# Patient Record
Sex: FEMALE | Race: OTHER | HISPANIC OR LATINO | URBAN - METROPOLITAN AREA
[De-identification: names, ages, dates, MRNs, and addresses within clinical notes are randomized per-mention and may not be internally consistent; named-entity substitution may affect disease eponyms.]

---

## 2024-11-08 ENCOUNTER — INPATIENT (INPATIENT)
Facility: HOSPITAL | Age: 79
LOS: 1 days | Discharge: HOME | DRG: 197 | End: 2024-11-10
Attending: FAMILY MEDICINE | Admitting: FAMILY MEDICINE
Payer: MEDICAID

## 2024-11-08 VITALS
OXYGEN SATURATION: 98 % | SYSTOLIC BLOOD PRESSURE: 171 MMHG | WEIGHT: 144.18 LBS | RESPIRATION RATE: 18 BRPM | TEMPERATURE: 98 F | DIASTOLIC BLOOD PRESSURE: 99 MMHG | HEART RATE: 91 BPM

## 2024-11-08 DIAGNOSIS — I82.409 ACUTE EMBOLISM AND THROMBOSIS OF UNSPECIFIED DEEP VEINS OF UNSPECIFIED LOWER EXTREMITY: ICD-10-CM

## 2024-11-08 LAB
ALBUMIN SERPL ELPH-MCNC: 4 G/DL — SIGNIFICANT CHANGE UP (ref 3.3–5)
ALP SERPL-CCNC: 114 U/L — SIGNIFICANT CHANGE UP (ref 40–120)
ALT FLD-CCNC: 16 U/L — SIGNIFICANT CHANGE UP (ref 12–78)
ANION GAP SERPL CALC-SCNC: 4 MMOL/L — LOW (ref 5–17)
APTT BLD: 28.3 SEC — SIGNIFICANT CHANGE UP (ref 24.5–35.6)
AST SERPL-CCNC: 16 U/L — SIGNIFICANT CHANGE UP (ref 15–37)
BASOPHILS # BLD AUTO: 0.05 K/UL — SIGNIFICANT CHANGE UP (ref 0–0.2)
BASOPHILS NFR BLD AUTO: 0.7 % — SIGNIFICANT CHANGE UP (ref 0–2)
BILIRUB SERPL-MCNC: 0.2 MG/DL — SIGNIFICANT CHANGE UP (ref 0.2–1.2)
BUN SERPL-MCNC: 28 MG/DL — HIGH (ref 7–23)
CALCIUM SERPL-MCNC: 9.9 MG/DL — SIGNIFICANT CHANGE UP (ref 8.5–10.1)
CHLORIDE SERPL-SCNC: 106 MMOL/L — SIGNIFICANT CHANGE UP (ref 96–108)
CO2 SERPL-SCNC: 28 MMOL/L — SIGNIFICANT CHANGE UP (ref 22–31)
CREAT SERPL-MCNC: 1.14 MG/DL — SIGNIFICANT CHANGE UP (ref 0.5–1.3)
EGFR: 49 ML/MIN/1.73M2 — LOW
EOSINOPHIL # BLD AUTO: 0.22 K/UL — SIGNIFICANT CHANGE UP (ref 0–0.5)
EOSINOPHIL NFR BLD AUTO: 2.9 % — SIGNIFICANT CHANGE UP (ref 0–6)
GLUCOSE SERPL-MCNC: 159 MG/DL — HIGH (ref 70–99)
HCT VFR BLD CALC: 36.1 % — SIGNIFICANT CHANGE UP (ref 34.5–45)
HGB BLD-MCNC: 11.6 G/DL — SIGNIFICANT CHANGE UP (ref 11.5–15.5)
IMM GRANULOCYTES NFR BLD AUTO: 0.3 % — SIGNIFICANT CHANGE UP (ref 0–0.9)
INR BLD: 0.91 RATIO — SIGNIFICANT CHANGE UP (ref 0.85–1.16)
LYMPHOCYTES # BLD AUTO: 1.13 K/UL — SIGNIFICANT CHANGE UP (ref 1–3.3)
LYMPHOCYTES # BLD AUTO: 14.7 % — SIGNIFICANT CHANGE UP (ref 13–44)
MCHC RBC-ENTMCNC: 29 PG — SIGNIFICANT CHANGE UP (ref 27–34)
MCHC RBC-ENTMCNC: 32.1 G/DL — SIGNIFICANT CHANGE UP (ref 32–36)
MCV RBC AUTO: 90.3 FL — SIGNIFICANT CHANGE UP (ref 80–100)
MONOCYTES # BLD AUTO: 0.56 K/UL — SIGNIFICANT CHANGE UP (ref 0–0.9)
MONOCYTES NFR BLD AUTO: 7.3 % — SIGNIFICANT CHANGE UP (ref 2–14)
NEUTROPHILS # BLD AUTO: 5.69 K/UL — SIGNIFICANT CHANGE UP (ref 1.8–7.4)
NEUTROPHILS NFR BLD AUTO: 74.1 % — SIGNIFICANT CHANGE UP (ref 43–77)
PLATELET # BLD AUTO: 307 K/UL — SIGNIFICANT CHANGE UP (ref 150–400)
POTASSIUM SERPL-MCNC: 4.3 MMOL/L — SIGNIFICANT CHANGE UP (ref 3.5–5.3)
POTASSIUM SERPL-SCNC: 4.3 MMOL/L — SIGNIFICANT CHANGE UP (ref 3.5–5.3)
PROT SERPL-MCNC: 8 GM/DL — SIGNIFICANT CHANGE UP (ref 6–8.3)
PROTHROM AB SERPL-ACNC: 10.7 SEC — SIGNIFICANT CHANGE UP (ref 9.9–13.4)
RBC # BLD: 4 M/UL — SIGNIFICANT CHANGE UP (ref 3.8–5.2)
RBC # FLD: 13.9 % — SIGNIFICANT CHANGE UP (ref 10.3–14.5)
SODIUM SERPL-SCNC: 138 MMOL/L — SIGNIFICANT CHANGE UP (ref 135–145)
WBC # BLD: 7.67 K/UL — SIGNIFICANT CHANGE UP (ref 3.8–10.5)
WBC # FLD AUTO: 7.67 K/UL — SIGNIFICANT CHANGE UP (ref 3.8–10.5)

## 2024-11-08 PROCEDURE — 84100 ASSAY OF PHOSPHORUS: CPT

## 2024-11-08 PROCEDURE — 73562 X-RAY EXAM OF KNEE 3: CPT | Mod: 26,LT

## 2024-11-08 PROCEDURE — 71045 X-RAY EXAM CHEST 1 VIEW: CPT | Mod: 26

## 2024-11-08 PROCEDURE — 99285 EMERGENCY DEPT VISIT HI MDM: CPT

## 2024-11-08 PROCEDURE — 93005 ELECTROCARDIOGRAM TRACING: CPT

## 2024-11-08 PROCEDURE — 80061 LIPID PANEL: CPT

## 2024-11-08 PROCEDURE — 93010 ELECTROCARDIOGRAM REPORT: CPT

## 2024-11-08 PROCEDURE — 80053 COMPREHEN METABOLIC PANEL: CPT

## 2024-11-08 PROCEDURE — 76376 3D RENDER W/INTRP POSTPROCES: CPT

## 2024-11-08 PROCEDURE — 85027 COMPLETE CBC AUTOMATED: CPT

## 2024-11-08 PROCEDURE — 83735 ASSAY OF MAGNESIUM: CPT

## 2024-11-08 PROCEDURE — 36415 COLL VENOUS BLD VENIPUNCTURE: CPT

## 2024-11-08 PROCEDURE — 93306 TTE W/DOPPLER COMPLETE: CPT

## 2024-11-08 PROCEDURE — 93970 EXTREMITY STUDY: CPT | Mod: 26

## 2024-11-08 PROCEDURE — 83036 HEMOGLOBIN GLYCOSYLATED A1C: CPT

## 2024-11-08 RX ORDER — APIXABAN 5 MG/1
1 TABLET, FILM COATED ORAL
Qty: 74 | Refills: 0
Start: 2024-11-08 | End: 2024-12-07

## 2024-11-08 RX ORDER — APIXABAN 5 MG/1
10 TABLET, FILM COATED ORAL ONCE
Refills: 0 | Status: COMPLETED | OUTPATIENT
Start: 2024-11-08 | End: 2024-11-08

## 2024-11-08 RX ORDER — ACETAMINOPHEN 500 MG
1000 TABLET ORAL ONCE
Refills: 0 | Status: COMPLETED | OUTPATIENT
Start: 2024-11-08 | End: 2024-11-08

## 2024-11-08 RX ORDER — ACETAMINOPHEN 500 MG
650 TABLET ORAL ONCE
Refills: 0 | Status: DISCONTINUED | OUTPATIENT
Start: 2024-11-08 | End: 2024-11-10

## 2024-11-08 RX ADMIN — APIXABAN 10 MILLIGRAM(S): 5 TABLET, FILM COATED ORAL at 20:43

## 2024-11-08 RX ADMIN — Medication 400 MILLIGRAM(S): at 20:42

## 2024-11-08 NOTE — ED ADULT NURSE NOTE - OBJECTIVE STATEMENT
Pt presents to ED w/ bilateral lower leg swelling and pain since last Monday. Pt notes she recently traveled from [facility] x2 weeks ago. Endorses difficulty walking. -SOB.

## 2024-11-08 NOTE — PHARMACOTHERAPY INTERVENTION NOTE - COMMENTS
Medication history complete, reviewed medications with patient grandchild at bedside no medication history records found on doctor first med hx.

## 2024-11-08 NOTE — ED ADULT NURSE NOTE - CHIEF COMPLAINT QUOTE
PT presents to er with complaints of LLE pain since last Monday, states she just traveled from [facility] 2 weeks ago, denies medical history, SOB, Chest pain.

## 2024-11-08 NOTE — ED STATDOCS - OBJECTIVE STATEMENT
78 y/o female w/PMHx of HTN. Pt is presenting to the ED c/o intermittent LE swelling and LE pain since last Monday. Pt mentions that she went to [facility] x2 weeks ago. Pt also endorses knee cramps.  Pt denies and difficulty breathing, chest pain, nausea, vomiting, and numbness/tingling. 80 y/o female w/PMHx of HTN. Pt is presenting to the ED c/o intermittent LE swelling and LE pain since last Monday. Pt mentions that she came from [facility] x2 weeks ago. Pt also endorses knee cramps.  Pt denies difficulty breathing, chest pain, nausea, vomiting, and numbness/tingling. no trauma.

## 2024-11-08 NOTE — ED STATDOCS - PHYSICAL EXAMINATION
General: Patient in no acute distress, AAOX3.   HENMT: NC/AT, no nasal congestion, MMM  Neck: supple  CVS: regular rate and rhythm, no murmur  Resp: Good air entry bilaterally, No wheeze/rhonchi.  Abd: Soft non tender, non distended, +bowel sounds, No guarding, rebound tenderness   Ext: FROM in all ext, 2+ pulses throughout, cap refill<2 sec. LLE w/ + tenderness to palpation and mild edema in calf , back of the knee area.   BACK: no midline tenderness, no stepoffs, no CVA ttp  NEURO: no focal deficit, gross motor and sensory intact throughout, General: Patient in no acute distress, AAOX3.   HENMT: NC/AT, no nasal congestion, MMM  Neck: supple  CVS: regular rate and rhythm, no murmur  Resp: Good air entry bilaterally, No wheeze/rhonchi.  Abd: Soft non tender, non distended, +bowel sounds, No guarding, rebound tenderness   Ext: FROM in all ext, 2+ pulses throughout, cap refill<2 sec. LLE w/ + ttp and mild edema in calf , back of the knee area.   BACK: no midline tenderness, no stepoffs, no CVA ttp  NEURO: no focal deficit, gross motor and sensory intact throughout,

## 2024-11-08 NOTE — ED STATDOCS - CARE PLAN
1 Principal Discharge DX:	DVT, lower extremity   Principal Discharge DX:	DVT, lower extremity  Secondary Diagnosis:	Leg pain, right  Secondary Diagnosis:	Unable to walk

## 2024-11-08 NOTE — ED STATDOCS - PROGRESS NOTE DETAILS
signed Mita Aiken PA-C Pt seen and evaluated initially in intake by Dr. Andres.  ID [id number]  79F c/o left lower leg pain since 11/4. pt recently arrived from [facility]. No CP or SOB. PMH HTN. no hx of DVTor blood clot. Pt says the pain got a lot worse yesterday and its painful to walk. No PMD. Labs with slightly elevated BUN. I discussed risks/benefits of anticoagulation with pt including risks of bleeding either spontaneously or from minor injury and need to seek medical attention for these situations. Pt agrees with anticoagulation and will f/u with a doctor this week.  Pt here with family who also heard f/u instructions and precautions. signed Mita Aiken PA-C   Pt still having a lot of pain and unable to weight bear or walk. Will admit, unable to safely discharge pt home, she is a fall risk and narcotic medications would only increase risk of fall. Pt agrees with admission and plan of care. Hospitalist paged. signed Mita Aiken PA-C   Case discussed with and admission accepted by hospitalist Dr. Tariq.

## 2024-11-08 NOTE — ED STATDOCS - CLINICAL SUMMARY MEDICAL DECISION MAKING FREE TEXT BOX
78 y/o female w/PMHx of HTN. Pt is presenting to the ED c/o intermittent LE swelling and LE pain since last Monday. will rule out DVT vs. arthritis. Plan to do Labs, imaging and reassess.

## 2024-11-08 NOTE — ED ADULT TRIAGE NOTE - CHIEF COMPLAINT QUOTE
PT presents to er with complaints of LLE pain since last Monday, states she just traveled from [facility] 2 weeks ago, denies medical history, SOB, Chest pain.

## 2024-11-08 NOTE — ED ADULT NURSE NOTE - NSFALLRISKINTERV_ED_ALL_ED
Assistance OOB with selected safe patient handling equipment if applicable/Assistance with ambulation/Communicate fall risk and risk factors to all staff, patient, and family/Monitor gait and stability/Provide visual cue: yellow wristband, yellow gown, etc/Reinforce activity limits and safety measures with patient and family/Call bell, personal items and telephone in reach/Instruct patient to call for assistance before getting out of bed/chair/stretcher/Non-slip footwear applied when patient is off stretcher/[place] to call system/Physically safe environment - no spills, clutter or unnecessary equipment/Purposeful Proactive Rounding/Room/bathroom lighting operational, light cord in reach

## 2024-11-08 NOTE — ED STATDOCS - ATTENDING APP SHARED VISIT CONTRIBUTION OF CARE
I Trudi Andres DO have personally seen and examined this patient.  I have fully participated in the care of this patient.  The initial assessment was performed by myself and then the patient was handed off to the ACP. The patient was followed and re-evaluated by the ACP. All labs, imaging and procedures were evaluated and performed by the ACP and I was available for consultation if any questions in the patients care came up.I have made amendments to the documentation where appropriate and otherwise agree with the history, physical exam, and plan as documented by the GABE.

## 2024-11-09 LAB
A1C WITH ESTIMATED AVERAGE GLUCOSE RESULT: 6 % — HIGH (ref 4–5.6)
ALBUMIN SERPL ELPH-MCNC: 3.7 G/DL — SIGNIFICANT CHANGE UP (ref 3.3–5)
ALP SERPL-CCNC: 106 U/L — SIGNIFICANT CHANGE UP (ref 40–120)
ALT FLD-CCNC: 15 U/L — SIGNIFICANT CHANGE UP (ref 12–78)
ANION GAP SERPL CALC-SCNC: 8 MMOL/L — SIGNIFICANT CHANGE UP (ref 5–17)
AST SERPL-CCNC: 14 U/L — LOW (ref 15–37)
BILIRUB SERPL-MCNC: 0.3 MG/DL — SIGNIFICANT CHANGE UP (ref 0.2–1.2)
BUN SERPL-MCNC: 25 MG/DL — HIGH (ref 7–23)
CALCIUM SERPL-MCNC: 9.5 MG/DL — SIGNIFICANT CHANGE UP (ref 8.5–10.1)
CHLORIDE SERPL-SCNC: 106 MMOL/L — SIGNIFICANT CHANGE UP (ref 96–108)
CHOLEST SERPL-MCNC: 221 MG/DL — HIGH
CO2 SERPL-SCNC: 25 MMOL/L — SIGNIFICANT CHANGE UP (ref 22–31)
CREAT SERPL-MCNC: 1.02 MG/DL — SIGNIFICANT CHANGE UP (ref 0.5–1.3)
EGFR: 56 ML/MIN/1.73M2 — LOW
ESTIMATED AVERAGE GLUCOSE: 126 MG/DL — HIGH (ref 68–114)
GLUCOSE SERPL-MCNC: 155 MG/DL — HIGH (ref 70–99)
HCT VFR BLD CALC: 34.2 % — LOW (ref 34.5–45)
HDLC SERPL-MCNC: 67 MG/DL — SIGNIFICANT CHANGE UP
HGB BLD-MCNC: 11.1 G/DL — LOW (ref 11.5–15.5)
LIPID PNL WITH DIRECT LDL SERPL: 138 MG/DL — HIGH
MAGNESIUM SERPL-MCNC: 1.9 MG/DL — SIGNIFICANT CHANGE UP (ref 1.6–2.6)
MCHC RBC-ENTMCNC: 28.8 PG — SIGNIFICANT CHANGE UP (ref 27–34)
MCHC RBC-ENTMCNC: 32.5 G/DL — SIGNIFICANT CHANGE UP (ref 32–36)
MCV RBC AUTO: 88.6 FL — SIGNIFICANT CHANGE UP (ref 80–100)
NON HDL CHOLESTEROL: 154 MG/DL — HIGH
PHOSPHATE SERPL-MCNC: 4.3 MG/DL — SIGNIFICANT CHANGE UP (ref 2.5–4.5)
PLATELET # BLD AUTO: 302 K/UL — SIGNIFICANT CHANGE UP (ref 150–400)
POTASSIUM SERPL-MCNC: 3.8 MMOL/L — SIGNIFICANT CHANGE UP (ref 3.5–5.3)
POTASSIUM SERPL-SCNC: 3.8 MMOL/L — SIGNIFICANT CHANGE UP (ref 3.5–5.3)
PROT SERPL-MCNC: 7.4 GM/DL — SIGNIFICANT CHANGE UP (ref 6–8.3)
RBC # BLD: 3.86 M/UL — SIGNIFICANT CHANGE UP (ref 3.8–5.2)
RBC # FLD: 14.1 % — SIGNIFICANT CHANGE UP (ref 10.3–14.5)
SODIUM SERPL-SCNC: 139 MMOL/L — SIGNIFICANT CHANGE UP (ref 135–145)
TRIGL SERPL-MCNC: 92 MG/DL — SIGNIFICANT CHANGE UP
WBC # BLD: 8.53 K/UL — SIGNIFICANT CHANGE UP (ref 3.8–10.5)
WBC # FLD AUTO: 8.53 K/UL — SIGNIFICANT CHANGE UP (ref 3.8–10.5)

## 2024-11-09 PROCEDURE — 93306 TTE W/DOPPLER COMPLETE: CPT | Mod: 26

## 2024-11-09 PROCEDURE — 76376 3D RENDER W/INTRP POSTPROCES: CPT | Mod: 26

## 2024-11-09 PROCEDURE — 99222 1ST HOSP IP/OBS MODERATE 55: CPT

## 2024-11-09 RX ORDER — HYDRALAZINE HYDROCHLORIDE 50 MG/1
5 TABLET, FILM COATED ORAL ONCE
Refills: 0 | Status: COMPLETED | OUTPATIENT
Start: 2024-11-09 | End: 2024-11-09

## 2024-11-09 RX ORDER — APIXABAN 5 MG/1
10 TABLET, FILM COATED ORAL EVERY 12 HOURS
Refills: 0 | Status: DISCONTINUED | OUTPATIENT
Start: 2024-11-09 | End: 2024-11-10

## 2024-11-09 RX ORDER — OXYCODONE HYDROCHLORIDE 30 MG/1
5 TABLET ORAL EVERY 4 HOURS
Refills: 0 | Status: DISCONTINUED | OUTPATIENT
Start: 2024-11-09 | End: 2024-11-10

## 2024-11-09 RX ORDER — SENNA 187 MG
2 TABLET ORAL AT BEDTIME
Refills: 0 | Status: DISCONTINUED | OUTPATIENT
Start: 2024-11-09 | End: 2024-11-10

## 2024-11-09 RX ORDER — ACETAMINOPHEN 500 MG
1000 TABLET ORAL EVERY 8 HOURS
Refills: 0 | Status: DISCONTINUED | OUTPATIENT
Start: 2024-11-09 | End: 2024-11-10

## 2024-11-09 RX ORDER — PANTOPRAZOLE SODIUM 40 MG/1
40 TABLET, DELAYED RELEASE ORAL
Refills: 0 | Status: DISCONTINUED | OUTPATIENT
Start: 2024-11-09 | End: 2024-11-10

## 2024-11-09 RX ORDER — NALOXONE HYDROCHLORIDE 1 MG/ML
0.4 INJECTION, SOLUTION INTRAMUSCULAR; INTRAVENOUS; SUBCUTANEOUS ONCE
Refills: 0 | Status: DISCONTINUED | OUTPATIENT
Start: 2024-11-09 | End: 2024-11-10

## 2024-11-09 RX ORDER — LOSARTAN POTASSIUM 25 MG/1
50 TABLET ORAL DAILY
Refills: 0 | Status: DISCONTINUED | OUTPATIENT
Start: 2024-11-09 | End: 2024-11-10

## 2024-11-09 RX ORDER — OXYCODONE HYDROCHLORIDE 30 MG/1
2.5 TABLET ORAL EVERY 4 HOURS
Refills: 0 | Status: DISCONTINUED | OUTPATIENT
Start: 2024-11-09 | End: 2024-11-10

## 2024-11-09 RX ORDER — POLYETHYLENE GLYCOL 3350 17 G/17G
17 POWDER, FOR SOLUTION ORAL DAILY
Refills: 0 | Status: DISCONTINUED | OUTPATIENT
Start: 2024-11-09 | End: 2024-11-10

## 2024-11-09 RX ADMIN — PANTOPRAZOLE SODIUM 40 MILLIGRAM(S): 40 TABLET, DELAYED RELEASE ORAL at 05:38

## 2024-11-09 RX ADMIN — APIXABAN 10 MILLIGRAM(S): 5 TABLET, FILM COATED ORAL at 09:16

## 2024-11-09 RX ADMIN — Medication 1000 MILLIGRAM(S): at 01:51

## 2024-11-09 RX ADMIN — Medication 2 TABLET(S): at 22:58

## 2024-11-09 RX ADMIN — POLYETHYLENE GLYCOL 3350 17 GRAM(S): 17 POWDER, FOR SOLUTION ORAL at 09:16

## 2024-11-09 RX ADMIN — Medication 10 MILLIGRAM(S): at 22:58

## 2024-11-09 RX ADMIN — LOSARTAN POTASSIUM 50 MILLIGRAM(S): 25 TABLET ORAL at 09:16

## 2024-11-09 RX ADMIN — APIXABAN 10 MILLIGRAM(S): 5 TABLET, FILM COATED ORAL at 22:59

## 2024-11-09 RX ADMIN — Medication 1000 MILLIGRAM(S): at 23:50

## 2024-11-09 RX ADMIN — Medication 1000 MILLIGRAM(S): at 23:00

## 2024-11-09 RX ADMIN — Medication 1000 MILLIGRAM(S): at 05:37

## 2024-11-09 RX ADMIN — HYDRALAZINE HYDROCHLORIDE 5 MILLIGRAM(S): 50 TABLET, FILM COATED ORAL at 04:23

## 2024-11-09 NOTE — H&P ADULT - HISTORY OF PRESENT ILLNESS
Patient is a 79-year-old female with a past medical history of hypertension presenting to  ED on 11/8/2024 for pain and swelling behind her left knee.  Patient recently came from [facility] on a 4-1/2-hour flight on 11/1/2024.On 11/3/2024 she started having pain behind her left knee region.  Her daughter had been managing the pain with Tylenol.  However, since the day prior to admissionthe pain had become unbearable and it was difficult for her to walk    Upon arrival to the ED vitals were, /99 HR 91 RR 18 T8.2 F and SpO2 of 98% on room air.  Chest x-ray with no acute pulmonary disease process.  Venous Doppler showed acute DVT of left posterior tibial vein.  She received IV Tylenol 1 g x 1, 10 mg Eliquis x 1 and IV hydralazine 5 mg x 1.    Currently, she denies any fever, chills, palpitations, shortness of breath, chest pain, headache, dizziness.  Family Hx: Denies any blood clotting disorders   Surgical Hx: hernia repair and hysterectomy   Social hx: denies tobacco, alcohol or recreational drug use

## 2024-11-09 NOTE — ED ADULT NURSE REASSESSMENT NOTE - NS ED NURSE REASSESS COMMENT FT1
Pt received from [facility]MS+Ox4, NAD, VSS.  used for  #[id number]. Respirations are equal and unlabored. Pt denies cp, sob, N/V. Daughter at bedside. Pt and family updated on plan of care. Patient profile completed. Awaiting bed assignment and further orders. Care continues as ordered. Fall and safety precautions maintained. Pt within view of nurses station.

## 2024-11-09 NOTE — H&P ADULT - ASSESSMENT
#LLE pain and swelling   #Acute DVT   #Inability to bear weight   - US venous doppler:acute DVT of left posterior tibial vein  - Continue Eliquis 10 mg BID x 7 days   - Continue pain control   - F/u ECHO   - F/u PT consult     #Hypertension  – At home is on irbesartan 150 mg   - Will continue losartan 50 mg QD

## 2024-11-09 NOTE — H&P ADULT - NSHPPHYSICALEXAM_GEN_ALL_CORE
Vital Signs Last 24 Hrs  T(C): 36.7 (09 Nov 2024 04:08), Max: 36.8 (08 Nov 2024 17:09)  T(F): 98 (09 Nov 2024 04:08), Max: 98.2 (08 Nov 2024 17:09)  HR: 72 (09 Nov 2024 04:08) (72 - 91)  BP: 160/92 (09 Nov 2024 04:08) (160/92 - 171/99)  BP(mean): 120 (08 Nov 2024 17:09) (120 - 120)  RR: 19 (09 Nov 2024 04:08) (18 - 19)  SpO2: 98% (09 Nov 2024 04:08) (98% - 98%)    Parameters below as of 09 Nov 2024 04:08  Patient On (Oxygen Delivery Method): room air    GENERAL: NAD  HEAD:  Atraumatic  EYES: EOMI  ENT: Moist mucous membranes  NECK: Supple  CHEST/LUNG: Unlabored respirations  HEART: Regular rate and rhythm  ABDOMEN: Soft, Nontender  EXTREMITIES: tenderness to palpation and mild swelling in the region of the L popliteal fossa  NERVOUS SYSTEM:  Alert & Oriented X3, speech clear. No deficits

## 2024-11-09 NOTE — ED ADULT NURSE REASSESSMENT NOTE - NS ED NURSE REASSESS COMMENT FT1
Patient brought to bathroom with wheelchair and daughter assistance. Pt voided clear yellow urine. Hydralazine administered as per MAR for high BP. Care continues as ordered. Pt awaiting bed assignment and further orders. Pt and family updated on plan of care with MD Tillman at bedside utilizing  phone. VSS. NAD.

## 2024-11-09 NOTE — PROGRESS NOTE ADULT - ASSESSMENT
79-year-old female with a past medical history of hypertension presenting to  ED on 11/8/2024 for pain and swelling behind her left knee.    #LLE pain and swelling   #Acute DVT   #Inability to bear weight   - US venous doppler:acute DVT of left posterior tibial vein  - Continue Eliquis 10 mg BID x 7 days, then 5 mg po BID  - Continue pain control   - F/u ECHO   - F/u PT consult     #Hypertension  – At home is on irbesartan 150 mg   - Will continue losartan 50 mg QD               79-year-old female with a past medical history of hypertension presenting to  ED on 11/8/2024 for pain and swelling behind her left knee.    #LLE pain and swelling   #Acute DVT   #Inability to bear weight   - US venous doppler:acute DVT of left posterior tibial vein  - Continue Eliquis 10 mg BID x 7 days, then 5 mg po BID  - Continue pain control   - F/u ECHO - EF 64%, normal Right ventricular systolic fx  - F/u PT consult     #Hypertension  – At home is on irbesartan 150 mg   - Will continue losartan 50 mg QD

## 2024-11-09 NOTE — PROGRESS NOTE ADULT - SUBJECTIVE AND OBJECTIVE BOX
HOSPITALIST ATTENDING PROGRESS NOTE    Chart and meds reviewed.  Patient seen and examined.    CC/ HPI Patient is a 79y old  Female who presents with a chief complaint of #LLE pain and swelling   #Acute DVT   #Inability to bear weight (09 Nov 2024 08:34)      Subjective: Seen with grandson at bedside. Patient requested grandson to translate. States she developed pain behind her left knee after flight from [facility].  States did not stand up for entire duration of the flight. No trauma to left leg prior to development of symptoms. No shortness of breath.     All other systems reviewed and found to be negative with the exception of what has been described above.    MEDICATIONS:  MEDICATIONS  (STANDING):  acetaminophen     Tablet .. 1000 milliGRAM(s) Oral every 8 hours  apixaban 10 milliGRAM(s) Oral every 12 hours  losartan 50 milliGRAM(s) Oral daily  naloxone Injectable 0.4 milliGRAM(s) IV Push once  pantoprazole    Tablet 40 milliGRAM(s) Oral before breakfast  polyethylene glycol 3350 17 Gram(s) Oral daily  senna 2 Tablet(s) Oral at bedtime      Vital Signs Last 24 Hrs  T(C): 36.8 (09 Nov 2024 15:59), Max: 36.9 (09 Nov 2024 08:08)  T(F): 98.2 (09 Nov 2024 15:59), Max: 98.5 (09 Nov 2024 08:08)  HR: 88 (09 Nov 2024 15:59) (72 - 89)  BP: 151/76 (09 Nov 2024 15:59) (123/68 - 160/92)  BP(mean): 87 (09 Nov 2024 05:17) (87 - 87)  RR: 19 (09 Nov 2024 15:59) (18 - 19)  SpO2: 97% (09 Nov 2024 15:59) (97% - 98%)    Parameters below as of 09 Nov 2024 15:59  Patient On (Oxygen Delivery Method): room air        I&O's Summary    09 Nov 2024 07:01  -  09 Nov 2024 17:18  --------------------------------------------------------  IN: 350 mL / OUT: 350 mL / NET: 0 mL        CAPILLARY BLOOD GLUCOSE          PHYSICAL EXAM:    Constitutional: NAD, awake and alert, well-developed  HEENT:  EOMI, Normal Hearing, MMM  Neck: Soft and supple, No LAD, No JVD  Respiratory: Breath sounds are clear bilaterally, No wheezing, rales or rhonchi  Cardiovascular: S1 and S2, regular rate and rhythm, no Murmurs, gallops or rubs  Gastrointestinal: Bowel Sounds present, soft, nontender, nondistended, no guarding, no rebound  Extremities: No peripheral edema  Vascular: 2+ peripheral pulses  Neurological: A/O x 3, no focal deficits  Musculoskeletal: 5/5 strength b/l upper and lower extremities  Skin: No rashes        LABS: All Labs Reviewed:                        11.1   8.53  )-----------( 302      ( 09 Nov 2024 07:59 )             34.2     11-09    139  |  106  |  25[H]  ----------------------------<  155[H]  3.8   |  25  |  1.02    Ca    9.5      09 Nov 2024 07:59  Phos  4.3     11-09  Mg     1.9     11-09    TPro  7.4  /  Alb  3.7  /  TBili  0.3  /  DBili  x   /  AST  14[L]  /  ALT  15  /  AlkPhos  106  11-09    PT/INR - ( 08 Nov 2024 18:29 )   PT: 10.7 sec;   INR: 0.91 ratio         PTT - ( 08 Nov 2024 18:29 )  PTT:28.3 sec    < from: TTE W or WO Ultrasound Enhancing Agent (11.09.24 @ 11:53) >  CONCLUSIONS:      1. Left ventricular systolic function is normal with ejection fraction 64 %.   2. Normal right ventricular systolic function.   3. Mild to moderate aortic regurgitation.    < end of copied text >      Blood Culture:     RADIOLOGY/EKG:    DVT PPX:    ADVANCED DIRECTIVE:    DISPOSITION: DC planning in 24-48 hours    Total time spent: 50 minutes

## 2024-11-10 VITALS
RESPIRATION RATE: 18 BRPM | OXYGEN SATURATION: 97 % | DIASTOLIC BLOOD PRESSURE: 67 MMHG | SYSTOLIC BLOOD PRESSURE: 119 MMHG | HEART RATE: 65 BPM | TEMPERATURE: 98 F

## 2024-11-10 LAB
ALBUMIN SERPL ELPH-MCNC: 3.5 G/DL — SIGNIFICANT CHANGE UP (ref 3.3–5)
ALP SERPL-CCNC: 88 U/L — SIGNIFICANT CHANGE UP (ref 40–120)
ALT FLD-CCNC: 13 U/L — SIGNIFICANT CHANGE UP (ref 12–78)
ANION GAP SERPL CALC-SCNC: 5 MMOL/L — SIGNIFICANT CHANGE UP (ref 5–17)
AST SERPL-CCNC: 13 U/L — LOW (ref 15–37)
BILIRUB SERPL-MCNC: 0.4 MG/DL — SIGNIFICANT CHANGE UP (ref 0.2–1.2)
BUN SERPL-MCNC: 32 MG/DL — HIGH (ref 7–23)
CALCIUM SERPL-MCNC: 9.9 MG/DL — SIGNIFICANT CHANGE UP (ref 8.5–10.1)
CHLORIDE SERPL-SCNC: 109 MMOL/L — HIGH (ref 96–108)
CO2 SERPL-SCNC: 26 MMOL/L — SIGNIFICANT CHANGE UP (ref 22–31)
CREAT SERPL-MCNC: 1.25 MG/DL — SIGNIFICANT CHANGE UP (ref 0.5–1.3)
EGFR: 44 ML/MIN/1.73M2 — LOW
GLUCOSE SERPL-MCNC: 123 MG/DL — HIGH (ref 70–99)
HCT VFR BLD CALC: 35.3 % — SIGNIFICANT CHANGE UP (ref 34.5–45)
HGB BLD-MCNC: 11.1 G/DL — LOW (ref 11.5–15.5)
MCHC RBC-ENTMCNC: 28.5 PG — SIGNIFICANT CHANGE UP (ref 27–34)
MCHC RBC-ENTMCNC: 31.4 G/DL — LOW (ref 32–36)
MCV RBC AUTO: 90.7 FL — SIGNIFICANT CHANGE UP (ref 80–100)
PLATELET # BLD AUTO: 300 K/UL — SIGNIFICANT CHANGE UP (ref 150–400)
POTASSIUM SERPL-MCNC: 4.1 MMOL/L — SIGNIFICANT CHANGE UP (ref 3.5–5.3)
POTASSIUM SERPL-SCNC: 4.1 MMOL/L — SIGNIFICANT CHANGE UP (ref 3.5–5.3)
PROT SERPL-MCNC: 6.9 GM/DL — SIGNIFICANT CHANGE UP (ref 6–8.3)
RBC # BLD: 3.89 M/UL — SIGNIFICANT CHANGE UP (ref 3.8–5.2)
RBC # FLD: 14.3 % — SIGNIFICANT CHANGE UP (ref 10.3–14.5)
SODIUM SERPL-SCNC: 140 MMOL/L — SIGNIFICANT CHANGE UP (ref 135–145)
WBC # BLD: 5.29 K/UL — SIGNIFICANT CHANGE UP (ref 3.8–10.5)
WBC # FLD AUTO: 5.29 K/UL — SIGNIFICANT CHANGE UP (ref 3.8–10.5)

## 2024-11-10 PROCEDURE — 99239 HOSP IP/OBS DSCHRG MGMT >30: CPT

## 2024-11-10 RX ORDER — OMEPRAZOLE 10 MG
1 CAPSULE,DELAYED RELEASE (ENTERIC COATED) ORAL
Refills: 0 | DISCHARGE

## 2024-11-10 RX ORDER — METHOCARBAMOL 500 MG/1
2 TABLET ORAL
Refills: 0 | DISCHARGE

## 2024-11-10 RX ORDER — IRBESARTAN 300 MG/300MG
1 TABLET ORAL
Qty: 15 | Refills: 0
Start: 2024-11-10 | End: 2024-11-24

## 2024-11-10 RX ORDER — IRBESARTAN 300 MG/300MG
1 TABLET ORAL
Refills: 0 | DISCHARGE

## 2024-11-10 RX ORDER — OMEPRAZOLE 10 MG
1 CAPSULE,DELAYED RELEASE (ENTERIC COATED) ORAL
Qty: 15 | Refills: 0
Start: 2024-11-10 | End: 2024-11-24

## 2024-11-10 RX ORDER — MELATONIN 5 MG
5 TABLET ORAL ONCE
Refills: 0 | Status: COMPLETED | OUTPATIENT
Start: 2024-11-10 | End: 2024-11-10

## 2024-11-10 RX ORDER — MELATONIN 5 MG
5 TABLET ORAL AT BEDTIME
Refills: 0 | Status: DISCONTINUED | OUTPATIENT
Start: 2024-11-10 | End: 2024-11-10

## 2024-11-10 RX ORDER — APIXABAN 5 MG/1
1 TABLET, FILM COATED ORAL
Qty: 74 | Refills: 0
Start: 2024-11-10 | End: 2024-12-09

## 2024-11-10 RX ADMIN — LOSARTAN POTASSIUM 50 MILLIGRAM(S): 25 TABLET ORAL at 09:32

## 2024-11-10 RX ADMIN — APIXABAN 10 MILLIGRAM(S): 5 TABLET, FILM COATED ORAL at 09:32

## 2024-11-10 RX ADMIN — Medication 1000 MILLIGRAM(S): at 06:08

## 2024-11-10 RX ADMIN — POLYETHYLENE GLYCOL 3350 17 GRAM(S): 17 POWDER, FOR SOLUTION ORAL at 09:32

## 2024-11-10 RX ADMIN — Medication 5 MILLIGRAM(S): at 00:44

## 2024-11-10 RX ADMIN — PANTOPRAZOLE SODIUM 40 MILLIGRAM(S): 40 TABLET, DELAYED RELEASE ORAL at 06:09

## 2024-11-10 NOTE — DISCHARGE NOTE PROVIDER - NSDCFUADDAPPT_GEN_ALL_CORE_FT
APPTS ARE READY TO BE MADE: [X] YES    Best Family or Patient Contact (if needed):    Additional Information about above appointments (if needed):    1: Thelma Ellison  2:  3:  APPTS ARE READY TO BE MADE: [X] YES    Best Family or Patient Contact (if needed):    Additional Information about above appointments (if needed):    1: Thelma Ellison  [place] [place]  2:  3:         11/15-Provided patient with provider/clinic referral information and advised them to follow-up with their insurance company. Spoke with  the Clinic in [place] and was told that patient need to add Dr. Thelma Ellison in the insurance to be able to go to the clinic. I advised patient to add  into the ins and call back IPR

## 2024-11-10 NOTE — DISCHARGE NOTE PROVIDER - HOSPITAL COURSE
Patient is a 79 y.o. female who presented with LLE pain and swelling with PMH HTN admitted for Left LE DVT and treated with Eliquis 10 mg oral twice daily.  Patient discharged in stable condition and instructed to complete Eliquis 10 mg by mouth twice daily x 1 week and then 5 mg by mouth twice daily thereafter.     < from: Xray Chest 1 View AP/PA. (11.08.24 @ 21:45) >    IMPRESSION: No acute cardiopulmonary disease process.    --- End of Report ---    < end of copied text >    < from: Xray Knee 3 Views, Left (11.08.24 @ 20:09) >    IMPRESSION: Demineralized bone. No acute abnormalities    --- End of Report ---    < end of copied text >    < from: US Duplex Venous Lower Ext Complete, Bilateral (11.08.24 @ 19:45) >    IMPRESSION:  Acute deep vein thrombosis of a left posterior tibial vein. Discussed   with Dr. Trudi Andres at 8:01 PM on 11/8/2024.    --- End of Report ---    < end of copied text >

## 2024-11-10 NOTE — DISCHARGE NOTE PROVIDER - NSDCMRMEDTOKEN_GEN_ALL_CORE_FT
irbesartan 150 mg oral tablet: 1 tab(s) orally once a day  methocarbamol 500 mg oral tablet: 2 tab(s) orally once a day  omeprazole 20 mg oral delayed release tablet: 1 tab(s) orally once a day   Eliquis 5 mg oral tablet: 1 dose(s) orally 2 times a day 10mg twice a day for 5 days (to complete a total of 7 days) then 5mg twice a day  irbesartan 150 mg oral tablet: 1 tab(s) orally once a day  methocarbamol 500 mg oral tablet: 2 tab(s) orally once a day  omeprazole 20 mg oral delayed release tablet: 1 tab(s) orally once a day   atorvastatin 10 mg oral tablet: 1 tab(s) orally once a day (at bedtime)  Eliquis 5 mg oral tablet: 1 dose(s) orally 2 times a day 10mg twice a day for 5 days (to complete a total of 7 days) then 5mg twice a day  irbesartan 150 mg oral tablet: 1 tab(s) orally once a day  omeprazole 20 mg oral delayed release tablet: 1 tab(s) orally once a day

## 2024-11-10 NOTE — DISCHARGE NOTE PROVIDER - ATTENDING DISCHARGE PHYSICAL EXAMINATION:
Vital Signs Last 24 Hrs  T(C): 37.1 (10 Nov 2024 09:01), Max: 37.1 (10 Nov 2024 09:01)  T(F): 98.7 (10 Nov 2024 09:01), Max: 98.7 (10 Nov 2024 09:01)  HR: 66 (10 Nov 2024 08:55) (66 - 82)  BP: 120/63 (10 Nov 2024 08:55) (120/63 - 130/76)  BP(mean): --  RR: 18 (10 Nov 2024 08:55) (18 - 18)  SpO2: 97% (10 Nov 2024 08:55) (97% - 97%)    Parameters below as of 10 Nov 2024 08:55  Patient On (Oxygen Delivery Method): room air    PHYSICAL EXAM:    Constitutional: NAD, awake and alert, well-developed  HEENT:  EOMI, Normal Hearing, MMM  Neck: Soft and supple, No LAD, No JVD  Respiratory: Breath sounds are clear bilaterally, No wheezing, rales or rhonchi  Cardiovascular: S1 and S2, regular rate and rhythm, no Murmurs, gallops or rubs  Gastrointestinal: Bowel Sounds present, soft, nontender, nondistended, no guarding, no rebound  Extremities: No peripheral edema  Vascular: 2+ peripheral pulses  Neurological: A/O x 3, no focal deficits  Musculoskeletal: 5/5 strength b/l upper and lower extremities  Skin: No rashes

## 2024-11-10 NOTE — DISCHARGE NOTE NURSING/CASE MANAGEMENT/SOCIAL WORK - PATIENT PORTAL LINK FT
You can access the FollowMyHealth Patient Portal offered by [facility] by registering at the following website: [email or web address]. By joining [employer]’s FollowMyHealth portal, you will also be able to view your health information using other applications (apps) compatible with our system.

## 2024-11-10 NOTE — DISCHARGE NOTE PROVIDER - NSDCCPCAREPLAN_GEN_ALL_CORE_FT
PRINCIPAL DISCHARGE DIAGNOSIS  Diagnosis: DVT, lower extremity  Assessment and Plan of Treatment: Eliquis 10 mg po twice daily x 1 week and then 5 mg po twice daily thereafter  You were diagnosed with a clot in your leg. You were started on a blood thinning medication. Your symptoms should improve over time. Please follow up with your primary care physician for further monitoring.        SECONDARY DISCHARGE DIAGNOSES  Diagnosis: Leg pain, right  Assessment and Plan of Treatment:     Diagnosis: Unable to walk  Assessment and Plan of Treatment:     Diagnosis: Impaired fasting glucose  Assessment and Plan of Treatment: Monitor HgA1c in 3 months  - low carbohydrate diet

## 2024-11-10 NOTE — DISCHARGE NOTE NURSING/CASE MANAGEMENT/SOCIAL WORK - NSDCFUADDAPPT_GEN_ALL_CORE_FT
APPTS ARE READY TO BE MADE: [X] YES    Best Family or Patient Contact (if needed):    Additional Information about above appointments (if needed):    1: Thelma Ellison  2:  3:

## 2024-11-10 NOTE — DISCHARGE NOTE PROVIDER - CARE PROVIDER_API CALL
Thelma Ellison  [address] [address]  Phone: [phone]  Fax: [phone]  Established Patient  Follow Up Time: 2 weeks

## 2024-11-10 NOTE — DISCHARGE NOTE NURSING/CASE MANAGEMENT/SOCIAL WORK - FINANCIAL ASSISTANCE
[facility] provides services at a reduced cost to those who are determined to be eligible through [facility]’s financial assistance program. Information regarding [facility]’s financial assistance program can be found by going to [email or web address] or by calling [phone].

## 2024-11-18 DIAGNOSIS — I82.442 ACUTE EMBOLISM AND THROMBOSIS OF LEFT TIBIAL VEIN: ICD-10-CM

## 2024-11-18 DIAGNOSIS — I10 ESSENTIAL (PRIMARY) HYPERTENSION: ICD-10-CM

## 2024-11-18 DIAGNOSIS — R26.2 DIFFICULTY IN WALKING, NOT ELSEWHERE CLASSIFIED: ICD-10-CM

## 2024-11-18 DIAGNOSIS — R73.01 IMPAIRED FASTING GLUCOSE: ICD-10-CM

## 2024-11-18 DIAGNOSIS — M79.662 PAIN IN LEFT LOWER LEG: ICD-10-CM
